# Patient Record
Sex: MALE | Race: WHITE | Employment: OTHER | URBAN - METROPOLITAN AREA
[De-identification: names, ages, dates, MRNs, and addresses within clinical notes are randomized per-mention and may not be internally consistent; named-entity substitution may affect disease eponyms.]

---

## 2017-11-27 ENCOUNTER — HOSPITAL ENCOUNTER (OUTPATIENT)
Age: 62
Discharge: HOME | End: 2017-11-27
Attending: EMERGENCY MEDICINE
Payer: COMMERCIAL

## 2017-11-27 ENCOUNTER — APPOINTMENT (OUTPATIENT)
Dept: GENERAL RADIOLOGY | Age: 62
End: 2017-11-27
Attending: EMERGENCY MEDICINE
Payer: COMMERCIAL

## 2017-11-27 VITALS
SYSTOLIC BLOOD PRESSURE: 143 MMHG | TEMPERATURE: 98 F | DIASTOLIC BLOOD PRESSURE: 89 MMHG | HEIGHT: 70 IN | WEIGHT: 205 LBS | OXYGEN SATURATION: 100 % | HEART RATE: 53 BPM | RESPIRATION RATE: 20 BRPM | BODY MASS INDEX: 29.35 KG/M2

## 2017-11-27 DIAGNOSIS — S22.31XA CLOSED FRACTURE OF ONE RIB OF RIGHT SIDE, INITIAL ENCOUNTER: Primary | ICD-10-CM

## 2017-11-27 PROCEDURE — 99203 OFFICE O/P NEW LOW 30 MIN: CPT

## 2017-11-27 PROCEDURE — 71101 X-RAY EXAM UNILAT RIBS/CHEST: CPT | Performed by: EMERGENCY MEDICINE

## 2017-11-27 RX ORDER — KETOCONAZOLE 20 MG/ML
SHAMPOO TOPICAL
COMMUNITY
Start: 2017-10-09

## 2017-11-27 RX ORDER — ATORVASTATIN CALCIUM 10 MG/1
TABLET, FILM COATED ORAL
COMMUNITY
Start: 2017-10-14

## 2017-11-27 NOTE — ED INITIAL ASSESSMENT (HPI)
Pt here with complaint of right rib pain, pt states he was taking things out of the car and tried to close the car with his car with his body, pt states that this happened 3 weeks ago but for the past 3-4 days pain has gotten worse, pt states he was unable

## 2017-11-27 NOTE — ED PROVIDER NOTES
Patient Seen in: [address]    History   Patient presents with:  Trauma (cardiovascular, musculoskeletal)    Stated Complaint: RIB PAIN    HPI    The patient is a 24-year-old male who has a history of multiple left rib f rib at the right inferior lateral chest wall  No ecchymosis  Discomfort with AP compression of the chest  Discomfort with movement of the right arm    ED Course   Labs Reviewed - No data to display    ED Course as of Nov 27 1019  --------------------------

## 2017-11-27 NOTE — ED NOTES
Pt discharged home, pt instructed to not do any heavy lifting , and to follow up with md if symptoms worsen